# Patient Record
Sex: FEMALE | Race: WHITE | NOT HISPANIC OR LATINO | Employment: OTHER | URBAN - NONMETROPOLITAN AREA
[De-identification: names, ages, dates, MRNs, and addresses within clinical notes are randomized per-mention and may not be internally consistent; named-entity substitution may affect disease eponyms.]

---

## 2023-07-11 DIAGNOSIS — N30.00 ACUTE CYSTITIS WITHOUT HEMATURIA: Primary | ICD-10-CM

## 2023-07-11 RX ORDER — CIPROFLOXACIN 500 MG/1
500 TABLET ORAL 2 TIMES DAILY
Qty: 10 TABLET | Refills: 0 | Status: SHIPPED | OUTPATIENT
Start: 2023-07-11 | End: 2023-07-13 | Stop reason: ALTCHOICE

## 2023-07-13 ENCOUNTER — TELEPHONE (OUTPATIENT)
Dept: PRIMARY CARE | Facility: CLINIC | Age: 83
End: 2023-07-13

## 2023-07-13 ENCOUNTER — OFFICE VISIT (OUTPATIENT)
Dept: PRIMARY CARE | Facility: CLINIC | Age: 83
End: 2023-07-13
Payer: MEDICARE

## 2023-07-13 VITALS
BODY MASS INDEX: 32.4 KG/M2 | OXYGEN SATURATION: 97 % | HEIGHT: 64 IN | HEART RATE: 76 BPM | DIASTOLIC BLOOD PRESSURE: 62 MMHG | SYSTOLIC BLOOD PRESSURE: 110 MMHG | WEIGHT: 189.8 LBS

## 2023-07-13 DIAGNOSIS — N30.00 ACUTE CYSTITIS WITHOUT HEMATURIA: ICD-10-CM

## 2023-07-13 DIAGNOSIS — H90.3 SENSORINEURAL HEARING LOSS (SNHL) OF BOTH EARS: ICD-10-CM

## 2023-07-13 DIAGNOSIS — M16.11 PRIMARY OSTEOARTHRITIS OF RIGHT HIP: ICD-10-CM

## 2023-07-13 DIAGNOSIS — Z01.810 PREOP CARDIOVASCULAR EXAM: ICD-10-CM

## 2023-07-13 DIAGNOSIS — E66.09 CLASS 1 OBESITY DUE TO EXCESS CALORIES WITHOUT SERIOUS COMORBIDITY WITH BODY MASS INDEX (BMI) OF 32.0 TO 32.9 IN ADULT: ICD-10-CM

## 2023-07-13 DIAGNOSIS — Z13.89 ENCOUNTER FOR SCREENING FOR OTHER DISORDER: ICD-10-CM

## 2023-07-13 DIAGNOSIS — Z00.00 ROUTINE GENERAL MEDICAL EXAMINATION AT HEALTH CARE FACILITY: Primary | ICD-10-CM

## 2023-07-13 PROBLEM — G89.29 CHRONIC PAIN OF RIGHT KNEE: Status: RESOLVED | Noted: 2023-07-13 | Resolved: 2023-07-13

## 2023-07-13 PROBLEM — M25.551 HIP PAIN, RIGHT: Status: RESOLVED | Noted: 2023-07-13 | Resolved: 2023-07-13

## 2023-07-13 PROBLEM — R33.9 INCOMPLETE BLADDER EMPTYING: Status: ACTIVE | Noted: 2023-07-13

## 2023-07-13 PROBLEM — R32 INCONTINENCE: Status: ACTIVE | Noted: 2023-07-13

## 2023-07-13 PROBLEM — N95.0 PMB (POSTMENOPAUSAL BLEEDING): Status: RESOLVED | Noted: 2023-07-13 | Resolved: 2023-07-13

## 2023-07-13 PROBLEM — N84.1 CERVICAL POLYP: Status: ACTIVE | Noted: 2023-07-13

## 2023-07-13 PROBLEM — Z13.6 ENCOUNTER FOR LIPID SCREENING FOR CARDIOVASCULAR DISEASE: Status: RESOLVED | Noted: 2023-07-13 | Resolved: 2023-07-13

## 2023-07-13 PROBLEM — Z13.6 ENCOUNTER FOR LIPID SCREENING FOR CARDIOVASCULAR DISEASE: Status: ACTIVE | Noted: 2023-07-13

## 2023-07-13 PROBLEM — H91.93 HEARING LOSS OF BOTH EARS: Status: ACTIVE | Noted: 2023-07-13

## 2023-07-13 PROBLEM — M16.12 PRIMARY OSTEOARTHRITIS OF LEFT HIP: Status: RESOLVED | Noted: 2023-07-13 | Resolved: 2023-07-13

## 2023-07-13 PROBLEM — J30.9 ALLERGIC RHINITIS: Status: ACTIVE | Noted: 2023-07-13

## 2023-07-13 PROBLEM — R10.2 PELVIC PAIN IN FEMALE: Status: RESOLVED | Noted: 2023-07-13 | Resolved: 2023-07-13

## 2023-07-13 PROBLEM — R79.89 LOW VITAMIN D LEVEL: Status: ACTIVE | Noted: 2023-07-13

## 2023-07-13 PROBLEM — M17.12 ARTHRITIS OF KNEE, LEFT: Status: ACTIVE | Noted: 2023-07-13

## 2023-07-13 PROBLEM — Z13.220 ENCOUNTER FOR LIPID SCREENING FOR CARDIOVASCULAR DISEASE: Status: RESOLVED | Noted: 2023-07-13 | Resolved: 2023-07-13

## 2023-07-13 PROBLEM — N39.0 ACUTE UTI: Status: RESOLVED | Noted: 2023-07-13 | Resolved: 2023-07-13

## 2023-07-13 PROBLEM — R52 PAIN: Status: RESOLVED | Noted: 2023-07-13 | Resolved: 2023-07-13

## 2023-07-13 PROBLEM — M54.50 LOW BACK PAIN SYNDROME: Status: ACTIVE | Noted: 2023-07-13

## 2023-07-13 PROBLEM — R20.0 NUMBNESS IN FEET: Status: RESOLVED | Noted: 2023-07-13 | Resolved: 2023-07-13

## 2023-07-13 PROBLEM — M25.561 CHRONIC PAIN OF RIGHT KNEE: Status: RESOLVED | Noted: 2023-07-13 | Resolved: 2023-07-13

## 2023-07-13 PROBLEM — Z13.220 ENCOUNTER FOR LIPID SCREENING FOR CARDIOVASCULAR DISEASE: Status: ACTIVE | Noted: 2023-07-13

## 2023-07-13 PROCEDURE — 1170F FXNL STATUS ASSESSED: CPT | Performed by: FAMILY MEDICINE

## 2023-07-13 PROCEDURE — G0444 DEPRESSION SCREEN ANNUAL: HCPCS | Performed by: FAMILY MEDICINE

## 2023-07-13 PROCEDURE — G0439 PPPS, SUBSEQ VISIT: HCPCS | Performed by: FAMILY MEDICINE

## 2023-07-13 PROCEDURE — 1160F RVW MEDS BY RX/DR IN RCRD: CPT | Performed by: FAMILY MEDICINE

## 2023-07-13 PROCEDURE — 99214 OFFICE O/P EST MOD 30 MIN: CPT | Performed by: FAMILY MEDICINE

## 2023-07-13 PROCEDURE — 1036F TOBACCO NON-USER: CPT | Performed by: FAMILY MEDICINE

## 2023-07-13 PROCEDURE — 1159F MED LIST DOCD IN RCRD: CPT | Performed by: FAMILY MEDICINE

## 2023-07-13 RX ORDER — SULFAMETHOXAZOLE AND TRIMETHOPRIM 800; 160 MG/1; MG/1
1 TABLET ORAL 2 TIMES DAILY
Qty: 10 TABLET | Refills: 0 | Status: SHIPPED | OUTPATIENT
Start: 2023-07-13 | End: 2023-07-18

## 2023-07-13 RX ORDER — IBUPROFEN 600 MG/1
TABLET ORAL
COMMUNITY
Start: 2013-10-11

## 2023-07-13 ASSESSMENT — ENCOUNTER SYMPTOMS
FATIGUE: 0
BACK PAIN: 0
EYE PAIN: 0
LOSS OF SENSATION IN FEET: 0
FEVER: 0
OCCASIONAL FEELINGS OF UNSTEADINESS: 0
ARTHRALGIAS: 1
NUMBNESS: 1
CONSTIPATION: 0
ADENOPATHY: 0
CHEST TIGHTNESS: 0
VOMITING: 0
BRUISES/BLEEDS EASILY: 0
CHILLS: 0
NAUSEA: 0
COLOR CHANGE: 0
DYSPHORIC MOOD: 0
BLOOD IN STOOL: 0
POLYPHAGIA: 0
SORE THROAT: 0
POLYDIPSIA: 0
EYE REDNESS: 0
SHORTNESS OF BREATH: 0
HEMATURIA: 0
PALPITATIONS: 0
DEPRESSION: 0
NERVOUS/ANXIOUS: 0
FREQUENCY: 1
ABDOMINAL PAIN: 0
DIARRHEA: 0
HEADACHES: 0
TREMORS: 0
DIZZINESS: 0
DYSURIA: 0
WEAKNESS: 0
TROUBLE SWALLOWING: 0
COUGH: 0
WHEEZING: 0

## 2023-07-13 ASSESSMENT — ACTIVITIES OF DAILY LIVING (ADL)
DOING_HOUSEWORK: INDEPENDENT
MANAGING_FINANCES: INDEPENDENT
BATHING: INDEPENDENT
GROCERY_SHOPPING: INDEPENDENT
TAKING_MEDICATION: INDEPENDENT
DRESSING: INDEPENDENT

## 2023-07-13 ASSESSMENT — PATIENT HEALTH QUESTIONNAIRE - PHQ9
SUM OF ALL RESPONSES TO PHQ9 QUESTIONS 1 AND 2: 0
1. LITTLE INTEREST OR PLEASURE IN DOING THINGS: NOT AT ALL
2. FEELING DOWN, DEPRESSED OR HOPELESS: NOT AT ALL

## 2023-07-13 NOTE — PATIENT INSTRUCTIONS
It is important to wear your sun block when you are going to spend more then a minute or two in the sun to reduce your risk of skin cancer    If you are a woman, then you should be doing a self breast exam once a month to feel for any lumps or bumps that do not resolve during the month. Call if you find this.    If you are a man, then you should be doing a self testicular exam once a month to feel for any lumps or bumps. Call if you find this.    You should get on average 150 minutes of cardiovascular exercise (such as brisk walking, running, biking, swimming, etc.) a week.  You should also limit food high in sodium, sugar and saturated/trans fats.  Eating lots of fruits and vegetables is good at helping lower cholesterol and blood pressure if prepared correctly    The age for colonoscopy is age 45-75 for average risk individuals    Prostate screen starts at age 50 and breast cancer screening is at age 40    Woman should get a pap smear between the ages of 21 and 65. The frequency depends on your age, the type of pap you had last and the result of the last pap.    Alcohol should be limited to 1 a day for women and 2 a day for men.    No amount of tobacco in any form is safe. It is recommended that no tobacco be used in any form.  Vapes are also not safe as there are multiple harmful chemicals in them and the heat can directly damage lung tissue.      Hold aspirin for 5-7 days prior to surgery and restart 2-3 days after or as recommended by surgeon. No ibuprofen, naproxen or other anti-inflammatory during that period as well. You may take acetaminophen (tylenol) instead.

## 2023-07-13 NOTE — PROGRESS NOTES
Subjective   Reason for Visit: Crista Matias is an 83 y.o. female here for a Medicare Wellness visit.     Past Medical, Surgical, and Family History reviewed and updated in chart.    Reviewed all medications by prescribing practitioner or clinical pharmacist (such as prescriptions, OTCs, herbal therapies and supplements) and documented in the medical record.    HPI  Pt presents for pre surgical clearance, having a left total hip arthroplasty on 7/19/23 at [facility]     Having right THR on 7/`19/23 with Dr. Corley  Right hip pain that has been progressive worsening for last year  Sharp pain, goes up to 10/10 pain  Worse- when start moving  Better- laying down  Occasionally leg carolyn at times  No swelling, bruising    Numbness in feet since back surgery     No CP, SOB, palpitations  No issues with anaesthesia in the past     Some urinary incontinence at night    Had labs and EKG a few days ago- EKG and labs reviewed    Ophtho- due to see  Dentist- dentures  Colonoscopy- N/A  UA/Micro-  Mammo- refused  DEXA- refused  PAP- N/A  Lung CT-  Coronary Calcium CT Score-  AAA-  EKG-  Pneumovax- 3/5/11  Prevnar- refused  Flu- refused  Shingrix- refused  Td- 2015  Hep C-  Advance Directives- refused copies  ETOH screen  MDD screen- 7/13/23    Patient Care Team:  Tyler Quevedo DO as PCP - General     Review of Systems   Constitutional:  Negative for chills, fatigue and fever.   HENT:  Negative for congestion, ear discharge, ear pain, hearing loss, nosebleeds, sore throat, tinnitus and trouble swallowing.    Eyes:  Negative for pain, redness and visual disturbance.   Respiratory:  Negative for cough, chest tightness, shortness of breath and wheezing.    Cardiovascular:  Negative for chest pain, palpitations and leg swelling.   Gastrointestinal:  Negative for abdominal pain, blood in stool, constipation, diarrhea, nausea and vomiting.   Endocrine: Negative for cold intolerance, heat intolerance, polydipsia, polyphagia and  polyuria.   Genitourinary:  Positive for frequency. Negative for dysuria, hematuria and urgency.   Musculoskeletal:  Positive for arthralgias. Negative for back pain and gait problem.   Skin:  Negative for color change and rash.   Neurological:  Positive for numbness. Negative for dizziness, tremors, syncope, weakness and headaches.   Hematological:  Negative for adenopathy. Does not bruise/bleed easily.   Psychiatric/Behavioral:  Negative for dysphoric mood. The patient is not nervous/anxious.        Objective   Vitals:  /62 (BP Location: Right arm, Patient Position: Sitting, BP Cuff Size: Large adult)   Pulse 76   Wt 86.1 kg (189 lb 12.8 oz)   SpO2 97%   BMI 32.58 kg/m²       Physical Exam  Vitals and nursing note reviewed.   Constitutional:       General: She is not in acute distress.     Appearance: Normal appearance.   HENT:      Head: Normocephalic and atraumatic.      Right Ear: Tympanic membrane, ear canal and external ear normal. Decreased hearing noted.      Left Ear: Tympanic membrane, ear canal and external ear normal. Decreased hearing noted.      Nose: Nose normal.      Mouth/Throat:      Mouth: Mucous membranes are moist.      Pharynx: Oropharynx is clear.   Eyes:      Extraocular Movements: Extraocular movements intact.      Pupils: Pupils are equal, round, and reactive to light.   Neck:      Vascular: No carotid bruit.   Cardiovascular:      Rate and Rhythm: Normal rate and regular rhythm.      Pulses: Normal pulses.      Heart sounds: Normal heart sounds. No murmur heard.  Pulmonary:      Effort: Pulmonary effort is normal.      Breath sounds: Normal breath sounds.   Abdominal:      General: Abdomen is flat. Bowel sounds are normal.      Palpations: Abdomen is soft. There is no mass.   Musculoskeletal:      Cervical back: Normal range of motion and neck supple.      Right lower leg: Edema present.      Left lower leg: Edema present.   Lymphadenopathy:      Cervical: No cervical  adenopathy.   Skin:     Capillary Refill: Capillary refill takes less than 2 seconds.   Neurological:      General: No focal deficit present.      Mental Status: She is alert and oriented to person, place, and time.      Cranial Nerves: No cranial nerve deficit.      Motor: No weakness.      Deep Tendon Reflexes: Reflexes normal.   Psychiatric:         Mood and Affect: Mood normal.         Behavior: Behavior normal.       Assessment/Plan   Problem List Items Addressed This Visit    None  Visit Diagnoses       Routine general medical examination at health care facility    -  Primary    Primary osteoarthritis of right hip        Preop cardiovascular exam        Encounter for screening for other disorder        Acute cystitis without hematuria            UTI- bactrim, fluids    Obesity- limit calories, increase activity level     Hearing Loss- not interested in audiogram or hearing aids     LBP- heat, ibuprofen     OA hip- f/u with ortho for THR    Hold aspirin for 5-7 days prior to surgery and restart 2-3 days after or as recommended by surgeon. No ibuprofen, naproxen or other anti-inflammatory during that period as well. You may take acetaminophen (tylenol) instead.    Cleared for surgery

## 2023-07-13 NOTE — TELEPHONE ENCOUNTER
Pt called and said that she went to the pharm and you cancelled her medication. Did you mean to do that or was that an accident? I did tell her that with Epic the system sometimes does goofy things.

## 2023-10-12 ENCOUNTER — APPOINTMENT (OUTPATIENT)
Dept: ORTHOPEDIC SURGERY | Facility: CLINIC | Age: 83
End: 2023-10-12
Payer: MEDICARE